# Patient Record
Sex: FEMALE | Race: WHITE | Employment: OTHER | URBAN - METROPOLITAN AREA
[De-identification: names, ages, dates, MRNs, and addresses within clinical notes are randomized per-mention and may not be internally consistent; named-entity substitution may affect disease eponyms.]

---

## 2017-01-01 ENCOUNTER — HOSPITAL ENCOUNTER (EMERGENCY)
Facility: HOSPITAL | Age: 77
End: 2017-01-01
Attending: EMERGENCY MEDICINE
Payer: MEDICARE

## 2017-01-01 VITALS
RESPIRATION RATE: 13 BRPM | DIASTOLIC BLOOD PRESSURE: 69 MMHG | OXYGEN SATURATION: 85 % | HEART RATE: 118 BPM | SYSTOLIC BLOOD PRESSURE: 143 MMHG

## 2017-01-01 DIAGNOSIS — I46.9 CARDIAC ARREST (HCC): Primary | ICD-10-CM

## 2017-01-01 PROCEDURE — 99285 EMERGENCY DEPT VISIT HI MDM: CPT

## 2017-01-23 NOTE — ED PROVIDER NOTES
Patient Seen in: [facility] AND [facility] Emergency Department    History   Patient presents with:  Hypotension (cardiovascular)    Stated Complaint: hypotension    HPI    40-year-old female presents from nursing home for evaluation of hypotension.   Patient arr Pulse 118  Resp 13  SpO2 85%        Physical Exam   Constitutional: She appears cachectic. HENT:   Head: Atraumatic. Cardiovascular:   No palpable pulse   Pulmonary/Chest:   Apneic   Neurological: She is unresponsive. Skin: Skin is dry.    Nursing not